# Patient Record
Sex: MALE | Race: WHITE | NOT HISPANIC OR LATINO | Employment: STUDENT | URBAN - METROPOLITAN AREA
[De-identification: names, ages, dates, MRNs, and addresses within clinical notes are randomized per-mention and may not be internally consistent; named-entity substitution may affect disease eponyms.]

---

## 2017-04-13 ENCOUNTER — GENERIC CONVERSION - ENCOUNTER (OUTPATIENT)
Dept: OTHER | Facility: OTHER | Age: 22
End: 2017-04-13

## 2018-01-12 ENCOUNTER — ALLSCRIPTS OFFICE VISIT (OUTPATIENT)
Dept: OTHER | Facility: OTHER | Age: 23
End: 2018-01-12

## 2018-01-12 LAB
BILIRUB UR QL STRIP: NORMAL
CLARITY UR: NORMAL
COLOR UR: YELLOW
GLUCOSE (HISTORICAL): NORMAL
HGB UR QL STRIP.AUTO: NORMAL
KETONES UR STRIP-MCNC: NORMAL MG/DL
LEUKOCYTE ESTERASE UR QL STRIP: NORMAL
NITRITE UR QL STRIP: NORMAL
PH UR STRIP.AUTO: 5 [PH]
PROT UR STRIP-MCNC: NORMAL MG/DL
SP GR UR STRIP.AUTO: 1
UROBILINOGEN UR QL STRIP.AUTO: 0.2

## 2018-01-12 NOTE — MISCELLANEOUS
Message   Recorded as Task   Date: 04/13/2017 10:35 AM, Created By: Jarrell Jordan   Task Name: Medical Complaint Callback   Assigned To: Wyvonnia Hamman   Regarding Patient: Ambar Martin, Status: Active   Comment:    Jarrell Jordan - 13 Apr 2017 10:35 AM     TASK CREATED  Pt's mother called to report pt "not doing well; hearing voices off and on for past 2 mo"  Mother states pt prescribed "some schizophrenia drug while in rehab"   Mother does not know name of medication  Pt was in drug rehab from Feb and dc's last week  Mother questioning if keppra related  Pt has not followed up with psych since discharge  Pt has neuro appt 9/13/17  Please advise  keppra 1000mg daily    [employer] [phone]   Wyvonnia Hamman - 13 Apr 2017 11:34 AM     TASK REPLIED TO: Previously Assigned To Neurology Clinical,Team  Any seizures since last visit? Please double check Keppra dose - should be 1000 mg bid  He needs to see psychiatry soon  Is it still Dr Wilberto Diallo? Keppra is not likely the cause of hallucinations  OK to keep f/u as 9/2017 if no recent seizures  If psychiatry feels Markham Phalen is contributing to hallucinations/mood problems then they should let us know and we could start transition to lamotrigine over the phone  Darby Burt - 13 Apr 2017 11:48 AM     TASK EDITED  My apologies, I apprently left out BID  Pt taking 1000mg BID  Mother denies seizures since last visit  I advised her of below and pt will f/u with Dr Wilberto Diallo          Signatures   Electronically signed by : JORDAN Cason ; Apr 13 2017 11:56AM EST                       (Author)

## 2018-01-13 NOTE — RESULT NOTES
Message   Keppra level is low and may not protect against seizures at this level  Did the pt miss any doses during the 3 days prior to this blood draw? If not, then I would like him to increase to 750 mg twice daily  I can enter an order for the new dose if it is needed       Verified Results  (1) LEVETIRACETAM [facility]) [id number] 11:50AM Marylou Hylton    Order Number: [id number]    Performed at:  [address]  [id number]  : Carol Garrett MD, Phone:  [phone]     Test Name Result Flag Reference   LEVETIRACETAM (KEPPRA) 6 0 ug/mL L 10 0 - 40 0

## 2018-01-15 NOTE — RESULT NOTES
Message   Level borderline low, but increased from prior after increase in dose to 750mg bid  No change to dose based on this level        Verified Results  (1) LEVETIRACETAM ( KEPPRA) [id number] 12:21PM Poncho Cortes   Performed at:  [address]  [id number]  : Mabel Rosen MD, Phone:  [phone]     Test Name Result Flag Reference   LEVETIRACETAM (KEPPRA) 9 6 ug/mL L 10 0 - 40 0

## 2018-01-15 NOTE — PROCEDURES
Procedures by Abby Varela MD at 3/10/2016   5:06 PM      Author:  Abby Varela MD Service:  Neurology Author Type:  Physician     Filed:  3/10/2016  5:10 PM Date of Service:  3/10/2016  5:06 PM Status:  Signed     :  Abby Varela MD (Physician)            24 Hour Ambulatory EEG       Patient Name:  Shreman Costa  MRN: [id number]   :  1995 File #: [id number]   Age: 22 y o  Encounter #: [id number]   Date performed: 3/9-3/10/2016           Report date: 3/10/2016          Study type: 24 hour Ambulatory EEG    ICD 10 diagnosis: Other Epilepsy G40 909    Start time: 3/9 1417 End time: 3/10 1351     -------------------------------------------------------------------------------------------------------------------   Patient History: This recording was observed in a 22 y o  male with history of generalized  tonic clonic seizures to classify epilepsy, estimate seizure recurrence risk and evaluate for undetected seizures  Medications include:   Keppra  clonazepam    -------------------------------------------------------------------------------------------------------------------   Description of Procedure:  ·  32 channel digital recording with electrodes placed according to the International 10-20 system with additional  T1/T2 electrodes and EKG  The recording  was technically satisfactory  -------------------------------------------------------------------------------------------------------------------   EEG Description:    The recording was performed with the patient awake, drowsy, and asleep  During wakefulness, there were long runs of well regulated, low to medium amplitude,  posteriorly dominant, symmetric 9 cps alpha rhythm that attenuated with eye opening  There were symmetric low amplitude, frontally dominant  beta activities  With drowsiness, alpha activity attenuated and was replaced by diffusely distributed theta activities   With sleep, symmetric vertex sharp waves, K complexes and sleep spindles were present      -------------------------------------------------------------------------------------------------------------------   Other findings:  Samples of the single lead ECG demonstrated a regular rhythm     -------------------------------------------------------------------------------------------------------------------   Events: There were no significant patient push button events  -------------------------------------------------------------------------------------------------------------------  Event Log:  The patient did not complete and returned an event diary  He reported no significant events to the tech     -------------------------------------------------------------------------------------------------------------------   EEG Interpretation: This 24 hour ambulatory EEG (day 1 of 1) recorded during wakefulness, drowsiness, and sleep is normal       Lencho Allen MD   [facility] [place]    Mar 10 2016  5:10PM [facility] Standard Time

## 2018-01-23 VITALS
DIASTOLIC BLOOD PRESSURE: 62 MMHG | BODY MASS INDEX: 22.12 KG/M2 | RESPIRATION RATE: 16 BRPM | TEMPERATURE: 97.6 F | HEIGHT: 71 IN | WEIGHT: 158 LBS | HEART RATE: 74 BPM | SYSTOLIC BLOOD PRESSURE: 120 MMHG
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
